# Patient Record
Sex: MALE | Race: WHITE | NOT HISPANIC OR LATINO | Employment: FULL TIME | URBAN - METROPOLITAN AREA
[De-identification: names, ages, dates, MRNs, and addresses within clinical notes are randomized per-mention and may not be internally consistent; named-entity substitution may affect disease eponyms.]

---

## 2023-09-15 ENCOUNTER — HOSPITAL ENCOUNTER (OUTPATIENT)
Dept: DATA CONVERSION | Facility: HOSPITAL | Age: 68
Discharge: HOME | End: 2023-09-15

## 2023-09-15 DIAGNOSIS — Z00.00 ENCOUNTER FOR GENERAL ADULT MEDICAL EXAMINATION WITHOUT ABNORMAL FINDINGS: ICD-10-CM

## 2023-09-15 LAB
APPEARANCE PLAS: ABNORMAL
CHOLEST SERPL-MCNC: 131 MG/DL (ref 133–200)
CHOLEST/HDLC SERPL: 3.3 RATIO
COLOR SPUN FLD: ABNORMAL
FASTING STATUS PATIENT QL REPORTED: ABNORMAL
HDLC SERPL-MCNC: 40 MG/DL
LDLC SERPL CALC-MCNC: 71 MG/DL (ref 65–130)
TRIGL SERPL-MCNC: 99 MG/DL (ref 40–150)

## 2023-10-19 DIAGNOSIS — M25.50 GENERALIZED JOINT PAIN: Primary | ICD-10-CM

## 2023-10-20 RX ORDER — MELOXICAM 15 MG/1
15 TABLET ORAL DAILY
Qty: 90 TABLET | Refills: 3 | Status: SHIPPED | OUTPATIENT
Start: 2023-10-20

## 2023-11-17 DIAGNOSIS — E78.5 DYSLIPIDEMIA: Primary | ICD-10-CM

## 2023-11-18 RX ORDER — ATORVASTATIN CALCIUM 80 MG/1
80 TABLET, FILM COATED ORAL DAILY
Qty: 90 TABLET | Refills: 3 | Status: SHIPPED | OUTPATIENT
Start: 2023-11-18

## 2024-03-11 ENCOUNTER — TELEPHONE (OUTPATIENT)
Dept: PRIMARY CARE | Facility: CLINIC | Age: 69
End: 2024-03-11
Payer: COMMERCIAL

## 2024-03-11 NOTE — TELEPHONE ENCOUNTER
Patient complain of having loose stool  since feb 2024 patient states it is like mush it is not liquid but it is not formed either, he stated he has tried OTC MEDS, he had cut down his meal intake, increase his fiber and nothing seem to help he is running to bathroom every 20 mins he states he has no control last seen in office 9/15/23 please advise

## 2024-03-14 ENCOUNTER — TELEPHONE (OUTPATIENT)
Dept: PRIMARY CARE | Facility: CLINIC | Age: 69
End: 2024-03-14
Payer: COMMERCIAL

## 2024-03-14 DIAGNOSIS — Z12.12 SCREENING FOR COLORECTAL CANCER: Primary | ICD-10-CM

## 2024-03-14 DIAGNOSIS — Z12.11 SCREENING FOR COLORECTAL CANCER: Primary | ICD-10-CM

## 2024-03-14 NOTE — TELEPHONE ENCOUNTER
Pt is needing a referral to Dr. Dubose for a colonoscopy, please pace the referral- I pulled it in, pended

## 2024-03-21 DIAGNOSIS — G25.81 RLS (RESTLESS LEGS SYNDROME): Primary | ICD-10-CM

## 2024-03-21 RX ORDER — ROPINIROLE 2 MG/1
2 TABLET, FILM COATED ORAL NIGHTLY
Qty: 90 TABLET | Refills: 3 | Status: SHIPPED | OUTPATIENT
Start: 2024-03-21

## 2024-03-21 RX ORDER — ROPINIROLE 2 MG/1
2 TABLET, FILM COATED ORAL NIGHTLY
COMMUNITY
End: 2024-03-21 | Stop reason: WASHOUT

## 2024-03-21 NOTE — TELEPHONE ENCOUNTER
Last office visit:    09/15/23  NO pending appt    I had to pull the medication into the med list?

## 2024-03-29 ENCOUNTER — OFFICE VISIT (OUTPATIENT)
Dept: SURGERY | Facility: CLINIC | Age: 69
End: 2024-03-29
Payer: COMMERCIAL

## 2024-03-29 VITALS
TEMPERATURE: 98 F | WEIGHT: 160 LBS | HEART RATE: 75 BPM | SYSTOLIC BLOOD PRESSURE: 140 MMHG | HEIGHT: 71 IN | BODY MASS INDEX: 22.4 KG/M2 | DIASTOLIC BLOOD PRESSURE: 60 MMHG

## 2024-03-29 DIAGNOSIS — Z85.048 HISTORY OF RECTAL CANCER: ICD-10-CM

## 2024-03-29 DIAGNOSIS — Z12.12 SCREENING FOR COLORECTAL CANCER: ICD-10-CM

## 2024-03-29 DIAGNOSIS — R19.4 CHANGE IN BOWEL HABIT: Primary | ICD-10-CM

## 2024-03-29 DIAGNOSIS — R19.5 HEME POSITIVE STOOL: ICD-10-CM

## 2024-03-29 DIAGNOSIS — Z12.11 SCREENING FOR COLORECTAL CANCER: ICD-10-CM

## 2024-03-29 PROCEDURE — 1159F MED LIST DOCD IN RCRD: CPT | Performed by: SURGERY

## 2024-03-29 PROCEDURE — 1160F RVW MEDS BY RX/DR IN RCRD: CPT | Performed by: SURGERY

## 2024-03-29 PROCEDURE — 99213 OFFICE O/P EST LOW 20 MIN: CPT | Performed by: SURGERY

## 2024-03-29 RX ORDER — OXAPROZIN 600 MG/1
1 TABLET, FILM COATED ORAL 2 TIMES DAILY
COMMUNITY
End: 2024-04-25 | Stop reason: ALTCHOICE

## 2024-03-29 RX ORDER — SILDENAFIL 50 MG/1
25 TABLET, FILM COATED ORAL
COMMUNITY
Start: 2013-09-17

## 2024-03-29 NOTE — PATIENT INSTRUCTIONS
We Will schedule you for a flexible sigmoidoscopy on May 14, 2024 due to recent change in bowel habit.  My office will provide you the preoperative instructions.  You will need to take a fleets enema prior to your procedure.  We will give you 1 bottle of magnesium citrate on the morning of the procedure

## 2024-03-29 NOTE — PROGRESS NOTES
Patient ID: Ever Gibbs is a 68 y.o. male.  Who presents for change in bowel habit.  Patient has a history of rectal carcinoma treated in 2008.  He had a colonoscopy in November 2022.    Subjective   HPI     Patient has a history of rectal carcinoma treated in 2008.  He had a colonoscopy in November 2022.    Review of Systems   All other systems reviewed and are negative.      Objective   Physical Exam  Vitals reviewed.   Constitutional:       Appearance: Normal appearance.   HENT:      Head: Normocephalic.   Cardiovascular:      Rate and Rhythm: Normal rate and regular rhythm.      Heart sounds: Normal heart sounds.   Pulmonary:      Effort: Pulmonary effort is normal.      Breath sounds: Normal breath sounds.   Abdominal:      General: Abdomen is flat. There is no distension.      Palpations: Abdomen is soft. There is no mass.      Tenderness: There is no abdominal tenderness. There is no guarding.      Hernia: No hernia is present.      Comments: Palpable fullness rectum.  Heme positive   Musculoskeletal:         General: Normal range of motion.      Cervical back: Normal range of motion.   Skin:     General: Skin is warm.   Neurological:      General: No focal deficit present.   Psychiatric:         Mood and Affect: Mood normal.         Problem List Items Addressed This Visit       Change in bowel habit - Primary    Heme positive stool    History of rectal cancer     Other Visit Diagnoses       Screening for colorectal cancer                 Assessment/Plan   -Will schedule flexible sigmoidoscopy to assess your left colon and anastomosis.  Will perform this on May 14, 2024

## 2024-04-25 ENCOUNTER — ANESTHESIA EVENT (OUTPATIENT)
Dept: ANESTHESIOLOGY | Facility: HOSPITAL | Age: 69
End: 2024-04-25

## 2024-04-25 ENCOUNTER — PRE-ADMISSION TESTING (OUTPATIENT)
Dept: PREADMISSION TESTING | Facility: HOSPITAL | Age: 69
End: 2024-04-25
Payer: COMMERCIAL

## 2024-04-25 PROBLEM — R73.03 PREDIABETES: Status: ACTIVE | Noted: 2024-04-25

## 2024-04-25 PROBLEM — R91.8 PULMONARY NODULES: Status: ACTIVE | Noted: 2024-04-25

## 2024-04-25 PROBLEM — I10 HTN (HYPERTENSION): Status: ACTIVE | Noted: 2024-04-25

## 2024-04-25 PROBLEM — E78.5 HYPERLIPIDEMIA: Status: ACTIVE | Noted: 2024-04-25

## 2024-04-25 RX ORDER — ALBUTEROL SULFATE 0.63 MG/3ML
0.63 SOLUTION RESPIRATORY (INHALATION) EVERY 6 HOURS PRN
COMMUNITY

## 2024-04-25 RX ORDER — CLOPIDOGREL BISULFATE 75 MG/1
75 TABLET ORAL DAILY
COMMUNITY

## 2024-04-25 RX ORDER — LISINOPRIL 10 MG/1
10 TABLET ORAL DAILY
COMMUNITY

## 2024-04-25 ASSESSMENT — DUKE ACTIVITY SCORE INDEX (DASI)
CAN YOU DO YARD WORK LIKE RAKING LEAVES, WEEDING OR PUSHING A MOWER: YES
CAN YOU DO HEAVY WORK AROUND THE HOUSE LIKE SCRUBBING FLOORS OR LIFTING AND MOVING HEAVY FURNITURE: NO
CAN YOU WALK A BLOCK OR TWO ON LEVEL GROUND: YES
CAN YOU RUN A SHORT DISTANCE: NO
CAN YOU WALK INDOORS, SUCH AS AROUND YOUR HOUSE: YES
TOTAL_SCORE: 28.7
CAN YOU DO MODERATE WORK AROUND THE HOUSE LIKE VACUUMING, SWEEPING FLOORS OR CARRYING GROCERIES: YES
CAN YOU HAVE SEXUAL RELATIONS: YES
CAN YOU PARTICIPATE IN MODERATE RECREATIONAL ACTIVITIES LIKE GOLF, BOWLING, DANCING, DOUBLES TENNIS OR THROWING A BASEBALL OR FOOTBALL: NO
CAN YOU TAKE CARE OF YOURSELF (EAT, DRESS, BATHE, OR USE TOILET): YES
CAN YOU DO LIGHT WORK AROUND THE HOUSE LIKE DUSTING OR WASHING DISHES: YES
CAN YOU PARTICIPATE IN STRENOUS SPORTS LIKE SWIMMING, SINGLES TENNIS, FOOTBALL, BASKETBALL, OR SKIING: NO
CAN YOU CLIMB A FLIGHT OF STAIRS OR WALK UP A HILL: YES
DASI METS SCORE: 6.3

## 2024-04-25 NOTE — ANESTHESIA PREPROCEDURE EVALUATION
Patient: Ever Gibbs    Procedure Information    Date: 04/25/24  Reason: anesthesia pre op         Relevant Problems   Cardiac   (+) HTN (hypertension)   (+) Hyperlipidemia      Pulmonary   (+) Pulmonary nodules     Prediabetic on Metformin       Chemistry    Lab Results   Component Value Date/Time     03/03/2023 0833    K 4.5 03/03/2023 0833     03/03/2023 0833    CO2 21 (L) 03/03/2023 0833    BUN 19 03/03/2023 0833    CREATININE 0.9 03/03/2023 0833    Lab Results   Component Value Date/Time    CALCIUM 9.6 03/03/2023 0833    ALKPHOS 55 03/03/2023 0833    AST 16 03/03/2023 0833    ALT 15 03/03/2023 0833    BILITOT 0.4 03/03/2023 0833         Clinical information reviewed:                   NPO Detail:  No data recorded     PHYSICAL EXAM    Anesthesia Plan    History of general anesthesia?: yes  History of complications of general anesthesia?: no    ASA 3     MAC     intravenous induction

## 2024-04-25 NOTE — PREPROCEDURE INSTRUCTIONS
Medication List            Accurate as of April 25, 2024  9:39 AM. Always use your most recent med list.                albuterol 0.63 mg/3 mL nebulizer solution  Medication Adjustments for Surgery: Take morning of surgery with sip of water, no other fluids     atorvastatin 80 mg tablet  Commonly known as: Lipitor  TAKE 1 TABLET BY MOUTH EVERY DAY  Medication Adjustments for Surgery: Continue until night before surgery     clopidogrel 75 mg tablet  Commonly known as: Plavix  Medication Adjustments for Surgery: Other (Comment)  Notes to patient: HOLD 5 DAYS PRIOR TO PROCEDURE     lisinopril 10 mg tablet  Medication Adjustments for Surgery: Stop 1 day before surgery     meloxicam 15 mg tablet  Commonly known as: Mobic  TAKE 1 TABLET BY MOUTH EVERY DAY  Medication Adjustments for Surgery: Stop 1 day before surgery     metFORMIN 250 MG split tablet  Commonly known as: Glucophage  Medication Adjustments for Surgery: Continue until night before surgery     rOPINIRole 2 mg tablet  Commonly known as: Requip  TAKE 1 TABLET BY MOUTH EVERY DAY AT BEDTIME  Medication Adjustments for Surgery: Take morning of surgery with sip of water, no other fluids     sildenafil 50 mg tablet  Commonly known as: Viagra                              NPO Instructions:    No Solid food after midnight the night before your procedure  You may have clear liquids up to three hours prior to procedure  Drink the bottle of mag. Citrate at 8pm the night before your procedure  Do the enema 2 hours prior to procedure    Additional Instructions:     Review your medication instructions, take indicated medications  If you have diabetes, please check your fasting blood sugar upon awakening.  If fasting blood sugar is <80 mg/dl, drink 100 ml of apple juice, time limit of 2 hours before  You may have clear liquids until TWO hours before surgery/procedure.  This includes water, black tea/coffee, (no milk or cream) apple juice and electrolyte drinks  ([place])  You may chew gum up to TWO hours before your surgery/procedure  Wear  comfortable loose fitting clothing  Do not use moisturizers, creams, lotions or perfume  All jewelry and valuables should be left at home    We will call you the business day before your procedure between 1-3p to confirm your procedure and arrival time  Please park in the back of the hospital, in the ED parking and proceed to the second floor  Please make arrangements to have a responsible adult take you home and stay with you for 24 hours  Please bring your ID and insurance card to your procedure  Please shower or bathe the morning of your procedure with antibacterial soap. You may receive instructions for Hibiclens shower.   Shower as you normally would do   Use soap from neck down, focusing on area that is having the surgery (avoid eyes and genitals)   Turn water off and let dry for three minutes.   Turn water back on and rinse off  When checked in to the outpatient unit, you will be asked to change into a hospital gown and remove all clothing, including underwear  An IV will be inserted   Your family or  will be asked to wait in the waiting room or cafeteria and will be notified when able to come sit with you  Please call [phone] with any further questions

## 2024-05-14 ENCOUNTER — HOSPITAL ENCOUNTER (OUTPATIENT)
Dept: GASTROENTEROLOGY | Facility: HOSPITAL | Age: 69
Setting detail: OUTPATIENT SURGERY
Discharge: HOME | End: 2024-05-14
Payer: COMMERCIAL

## 2024-05-14 ENCOUNTER — ANESTHESIA (OUTPATIENT)
Dept: GASTROENTEROLOGY | Facility: HOSPITAL | Age: 69
End: 2024-05-14
Payer: COMMERCIAL

## 2024-05-14 ENCOUNTER — ANESTHESIA EVENT (OUTPATIENT)
Dept: GASTROENTEROLOGY | Facility: HOSPITAL | Age: 69
End: 2024-05-14
Payer: COMMERCIAL

## 2024-05-14 VITALS
WEIGHT: 160 LBS | TEMPERATURE: 98.2 F | RESPIRATION RATE: 18 BRPM | HEIGHT: 71 IN | OXYGEN SATURATION: 92 % | SYSTOLIC BLOOD PRESSURE: 119 MMHG | HEART RATE: 71 BPM | DIASTOLIC BLOOD PRESSURE: 71 MMHG | BODY MASS INDEX: 22.4 KG/M2

## 2024-05-14 DIAGNOSIS — R19.4 CHANGE IN BOWEL HABIT: ICD-10-CM

## 2024-05-14 DIAGNOSIS — R19.5 HEME POSITIVE STOOL: ICD-10-CM

## 2024-05-14 PROBLEM — Z79.01 ANTICOAGULANT LONG-TERM USE: Status: ACTIVE | Noted: 2024-05-14

## 2024-05-14 PROBLEM — I73.9 PERIPHERAL VASCULAR DISEASE (CMS-HCC): Status: ACTIVE | Noted: 2024-05-14

## 2024-05-14 LAB — GLUCOSE BLD MANUAL STRIP-MCNC: 98 MG/DL (ref 74–99)

## 2024-05-14 PROCEDURE — 2500000004 HC RX 250 GENERAL PHARMACY W/ HCPCS (ALT 636 FOR OP/ED): Performed by: NURSE ANESTHETIST, CERTIFIED REGISTERED

## 2024-05-14 PROCEDURE — 3700000002 HC GENERAL ANESTHESIA TIME - EACH INCREMENTAL 1 MINUTE

## 2024-05-14 PROCEDURE — 3700000001 HC GENERAL ANESTHESIA TIME - INITIAL BASE CHARGE

## 2024-05-14 PROCEDURE — 7100000010 HC PHASE TWO TIME - EACH INCREMENTAL 1 MINUTE

## 2024-05-14 PROCEDURE — 2500000004 HC RX 250 GENERAL PHARMACY W/ HCPCS (ALT 636 FOR OP/ED)

## 2024-05-14 PROCEDURE — 7100000009 HC PHASE TWO TIME - INITIAL BASE CHARGE

## 2024-05-14 PROCEDURE — 45330 DIAGNOSTIC SIGMOIDOSCOPY: CPT | Performed by: SURGERY

## 2024-05-14 PROCEDURE — 82947 ASSAY GLUCOSE BLOOD QUANT: CPT

## 2024-05-14 RX ORDER — SODIUM CHLORIDE, SODIUM LACTATE, POTASSIUM CHLORIDE, CALCIUM CHLORIDE 600; 310; 30; 20 MG/100ML; MG/100ML; MG/100ML; MG/100ML
20 INJECTION, SOLUTION INTRAVENOUS CONTINUOUS
Status: DISCONTINUED | OUTPATIENT
Start: 2024-05-14 | End: 2024-05-15 | Stop reason: HOSPADM

## 2024-05-14 RX ORDER — PROPOFOL 10 MG/ML
INJECTION, EMULSION INTRAVENOUS AS NEEDED
Status: DISCONTINUED | OUTPATIENT
Start: 2024-05-14 | End: 2024-05-14

## 2024-05-14 RX ADMIN — SODIUM CHLORIDE, POTASSIUM CHLORIDE, SODIUM LACTATE AND CALCIUM CHLORIDE 20 ML/HR: 600; 310; 30; 20 INJECTION, SOLUTION INTRAVENOUS at 06:57

## 2024-05-14 RX ADMIN — PROPOFOL 50 MG: 10 INJECTION, EMULSION INTRAVENOUS at 07:30

## 2024-05-14 RX ADMIN — PROPOFOL 50 MG: 10 INJECTION, EMULSION INTRAVENOUS at 07:34

## 2024-05-14 RX ADMIN — PROPOFOL 100 MG: 10 INJECTION, EMULSION INTRAVENOUS at 07:24

## 2024-05-14 SDOH — HEALTH STABILITY: MENTAL HEALTH: CURRENT SMOKER: 1

## 2024-05-14 ASSESSMENT — PAIN - FUNCTIONAL ASSESSMENT
PAIN_FUNCTIONAL_ASSESSMENT: 0-10

## 2024-05-14 ASSESSMENT — PAIN SCALES - GENERAL
PAINLEVEL_OUTOF10: 0 - NO PAIN

## 2024-05-14 ASSESSMENT — COLUMBIA-SUICIDE SEVERITY RATING SCALE - C-SSRS
2. HAVE YOU ACTUALLY HAD ANY THOUGHTS OF KILLING YOURSELF?: NO
1. IN THE PAST MONTH, HAVE YOU WISHED YOU WERE DEAD OR WISHED YOU COULD GO TO SLEEP AND NOT WAKE UP?: NO
6. HAVE YOU EVER DONE ANYTHING, STARTED TO DO ANYTHING, OR PREPARED TO DO ANYTHING TO END YOUR LIFE?: NO

## 2024-05-14 NOTE — H&P
History Of Present Illness  Ever Gibbs is a 68 y.o. male presenting for a flexible sigmoidoscopy.  Has been having some heme positive stools.     Past Medical History  Past Medical History:   Diagnosis Date    Hypertension     Other conditions influencing health status     Colon Cancer    Other conditions influencing health status     Arthritis    Perforated chronic peptic ulcer (Multi)        Surgical History  Past Surgical History:   Procedure Laterality Date    CT AORTA AND BILATERAL ILIOFEMORAL RUNOFF ANGIOGRAM W AND/OR WO IV CONTRAST  03/01/2023    CT AORTA AND BILATERAL ILIOFEMORAL RUNOFF ANGIOGRAM W AND/OR WO IV CONTRAST [facility] INPATIENT LEGACY    FEMORAL ARTERY STENT      ILEOSTOMY  10/15/2012    Ileostomy    OTHER SURGICAL HISTORY  10/15/2012    Ileostomy Reversal        Social History  He reports that he has been smoking cigarettes. He has never used smokeless tobacco. He reports current alcohol use. He reports that he does not use drugs.    Family History  No family history on file.     Allergies  Sulfa (sulfonamide antibiotics) and Adhesive tape-silicones    Review of Systems   All other systems reviewed and are negative.       Physical Exam  Vitals reviewed.   Constitutional:       Appearance: Normal appearance.   HENT:      Head: Normocephalic.   Cardiovascular:      Rate and Rhythm: Normal rate and regular rhythm.      Heart sounds: Normal heart sounds.   Pulmonary:      Effort: Pulmonary effort is normal.      Breath sounds: Normal breath sounds.   Abdominal:      General: Abdomen is flat. There is no distension.      Palpations: Abdomen is soft. There is no mass.      Tenderness: There is no abdominal tenderness. There is no guarding.   Musculoskeletal:         General: Normal range of motion.      Cervical back: Normal range of motion.   Skin:     General: Skin is warm.   Neurological:      General: No focal deficit present.   Psychiatric:         Mood and Affect: Mood normal.          Last  "Recorded Vitals  Blood pressure 125/57, pulse 100, temperature 36.3 °C (97.3 °F), temperature source Temporal, resp. rate 17, height 1.803 m (5' 11\"), weight 72.6 kg (160 lb), SpO2 100%.    FLEXIBLE SIGMOIDOSCOPY /BIOPSIES.  Risks include, but not limited to pain, infection, bleeding, perforation, missed lesions, aspiration, risk of cardiac, pulmonary, neurologic, locomotor, anesthetic events, incomplete colonoscopy, and other unforeseen complications including death  Lg Dubose MD    "

## 2024-05-14 NOTE — ANESTHESIA PREPROCEDURE EVALUATION
Patient: Ever Gibbs    Procedure Information       Date/Time: 05/14/24 0730    Scheduled providers: Lg Dubose MD    Procedure: FLEXIBLE SIGMOIDOSCOPY    Location: [facility]            Relevant Problems   Anesthesia (within normal limits)      Cardiac   (+) HTN (hypertension)   (+) Hyperlipidemia   (+) Peripheral vascular disease (CMS-HCC)      Pulmonary   (+) Pulmonary nodules      Neuro (within normal limits)      GI (within normal limits)      /Renal (within normal limits)      Liver (within normal limits)      Endocrine (within normal limits)      Hematology   (+) Anticoagulant long-term use      Musculoskeletal (within normal limits)      HEENT (within normal limits)      ID (within normal limits)      Skin (within normal limits)      GYN (within normal limits)       Clinical information reviewed:   Tobacco  Allergies  Meds   Med Hx  Surg Hx   Fam Hx  Soc Hx        NPO Detail:  NPO/Void Status  Carbohydrate Drink Given Prior to Surgery? : N  Date of Last Liquid: 05/13/24  Time of Last Liquid: 2200  Date of Last Solid: 05/12/24  Time of Last Solid: 1900  Last Intake Type: Clear fluids         Physical Exam    Airway  Mallampati: II  TM distance: >3 FB  Neck ROM: full     Cardiovascular - normal exam     Dental   (+) upper dentures, lower dentures     Pulmonary - normal exam     Abdominal - normal exam         Anesthesia Plan    History of general anesthesia?: yes  History of complications of general anesthesia?: no    ASA 3     MAC     The patient is a current smoker.  Patient was not previously instructed to abstain from smoking on day of procedure.  Patient did not smoke on day of procedure.    intravenous induction   Anesthetic plan and risks discussed with patient.  Use of blood products discussed with patient who consented to blood products.    Plan discussed with CRNA.

## 2024-05-14 NOTE — DISCHARGE INSTRUCTIONS

## 2024-05-14 NOTE — ANESTHESIA POSTPROCEDURE EVALUATION
Patient: Ever Gibbs    Procedure Summary       Date: 05/14/24 Room / Location: [facility]    Anesthesia Start: 0720 Anesthesia Stop: 0739    Procedure: FLEXIBLE SIGMOIDOSCOPY Diagnosis:       Change in bowel habit      Heme positive stool    Scheduled Providers: Lg Dubose MD Responsible Provider: EDILSON Sosa    Anesthesia Type: MAC ASA Status: 3            Anesthesia Type: MAC    Vitals Value Taken Time   BP 98/61 05/14/24 0738   Temp 36.8 °C (98.2 °F) 05/14/24 0738   Pulse 65 05/14/24 0738   Resp 16 05/14/24 0738   SpO2 94 % 05/14/24 0738       Anesthesia Post Evaluation    Patient location during evaluation: PACU  Patient participation: complete - patient participated  Level of consciousness: awake and alert  Pain management: satisfactory to patient  Airway patency: patent  Cardiovascular status: acceptable  Respiratory status: acceptable  Hydration status: acceptable  Postoperative Nausea and Vomiting: none        There were no known notable events for this encounter.

## 2024-05-16 ASSESSMENT — PAIN SCALES - GENERAL: PAINLEVEL_OUTOF10: 0 - NO PAIN

## 2024-05-20 DIAGNOSIS — E78.5 DYSLIPIDEMIA: ICD-10-CM

## 2024-05-20 RX ORDER — ATORVASTATIN CALCIUM 80 MG/1
80 TABLET, FILM COATED ORAL DAILY
Qty: 90 TABLET | Refills: 0 | Status: CANCELLED | OUTPATIENT
Start: 2024-05-20

## 2024-09-05 DIAGNOSIS — R73.03 PREDIABETES: Primary | ICD-10-CM

## 2024-09-06 PROBLEM — E55.9 VITAMIN D DEFICIENCY: Status: ACTIVE | Noted: 2024-09-06

## 2024-09-06 PROBLEM — G25.81 RESTLESS LEGS SYNDROME: Status: ACTIVE | Noted: 2024-09-06

## 2024-09-06 PROBLEM — R19.5 HEME POSITIVE STOOL: Status: RESOLVED | Noted: 2024-03-29 | Resolved: 2024-09-06

## 2024-09-06 PROBLEM — E78.5 DYSLIPIDEMIA: Status: ACTIVE | Noted: 2024-09-06

## 2024-09-06 PROBLEM — E11.9 TYPE 2 DIABETES MELLITUS WITHOUT COMPLICATION (MULTI): Status: ACTIVE | Noted: 2024-09-06

## 2024-09-06 PROBLEM — N52.9 IMPOTENCE OF ORGANIC ORIGIN: Status: ACTIVE | Noted: 2024-09-06

## 2024-09-06 PROBLEM — Z20.822 CONTACT WITH AND (SUSPECTED) EXPOSURE TO COVID-19: Status: RESOLVED | Noted: 2023-03-01 | Resolved: 2024-09-06

## 2024-09-06 PROBLEM — E78.5 DYSLIPIDEMIA: Status: ACTIVE | Noted: 2024-04-25

## 2024-09-06 PROBLEM — R19.4 CHANGE IN BOWEL HABIT: Status: RESOLVED | Noted: 2024-03-29 | Resolved: 2024-09-06

## 2024-09-06 PROBLEM — M19.90 GENERALIZED ARTHRITIS: Status: ACTIVE | Noted: 2023-03-01

## 2024-09-06 PROBLEM — Z20.822 CONTACT WITH AND (SUSPECTED) EXPOSURE TO COVID-19: Status: ACTIVE | Noted: 2023-03-01

## 2024-09-06 PROBLEM — H90.3 SENSORINEURAL HEARING LOSS, BILATERAL: Status: ACTIVE | Noted: 2024-09-06

## 2024-09-06 PROBLEM — G57.12 MERALGIA PARESTHETICA, LEFT LOWER LIMB: Status: ACTIVE | Noted: 2024-09-06

## 2024-09-06 PROBLEM — K26.1: Status: ACTIVE | Noted: 2019-04-12

## 2024-09-06 PROBLEM — F17.200 SMOKER: Status: ACTIVE | Noted: 2023-03-14

## 2024-09-06 PROBLEM — J44.9 CHRONIC OBSTRUCTIVE PULMONARY DISEASE (MULTI): Status: ACTIVE | Noted: 2024-09-06

## 2024-09-06 PROBLEM — L97.529 ULCER OF LEFT FOOT (MULTI): Status: ACTIVE | Noted: 2023-03-01

## 2024-09-06 PROBLEM — R73.03 PREDIABETES: Status: ACTIVE | Noted: 2024-09-06

## 2024-09-06 PROBLEM — M19.90 OSTEOARTHRITIS: Status: ACTIVE | Noted: 2024-09-06

## 2024-09-06 NOTE — PROGRESS NOTES
HPI:  pt here for routine wellness exam w/o any issues .      [facility], HLD, copd etc.    Recent labs in July: cbc, cmp, u/a, a/c ratio, hga1c all good.  Hga1c 6.2    PMH:   Past Medical History:   Diagnosis Date    Anticoagulant long-term use 05/14/2024    Chronic obstructive pulmonary disease (Multi) 09/06/2024    Contact with and (suspected) exposure to covid-19 03/01/2023    Dyslipidemia 04/25/2024    Generalized arthritis 03/01/2023    History of rectal cancer 03/29/2024 2008    Hypertension     Impotence of organic origin 09/06/2024    Meralgia paresthetica, left lower limb 09/06/2024    Perforated chronic peptic ulcer (Multi) 2019    Peripheral vascular disease (CMS-HCC) 05/14/2024    Pulmonary nodules 04/25/2024 2013-resolved on own    Restless legs syndrome 09/06/2024    Smoker 03/14/2023    Ulcer of left foot (Multi) 03/01/2023    Vitamin D deficiency 09/06/2024     MEDICATIONS:   Current Outpatient Medications   Medication Sig Dispense Refill    albuterol 0.63 mg/3 mL nebulizer solution Take 3 mL (0.63 mg) by nebulization every 6 hours if needed for wheezing.      atorvastatin (Lipitor) 80 mg tablet TAKE 1 TABLET BY MOUTH EVERY DAY 90 tablet 3    clopidogrel (Plavix) 75 mg tablet Take 1 tablet (75 mg) by mouth once daily.      lisinopril 10 mg tablet Take 1 tablet (10 mg) by mouth once daily.      meloxicam (Mobic) 15 mg tablet TAKE 1 TABLET BY MOUTH EVERY DAY 90 tablet 3    metFORMIN (Glucophage) 250 MG split tablet Take by mouth.      rOPINIRole (Requip) 2 mg tablet TAKE 1 TABLET BY MOUTH EVERY DAY AT BEDTIME 90 tablet 3    sildenafil (Viagra) 50 mg tablet Take 0.5 tablets (25 mg) by mouth.       No current facility-administered medications for this visit.     ALLERGIES:    Allergies   Allergen Reactions    Sulfa (Sulfonamide Antibiotics) Unknown    Adhesive Tape-Silicones Rash     SOCIAL HX:    Social History     Tobacco Use    Smoking status: Every Day     Current  packs/day: 1.00     Average packs/day: 1 pack/day for 60.7 years (60.7 ttl pk-yrs)     Types: Cigarettes     Start date: 1/1/1964    Smokeless tobacco: Never    Tobacco comments:     57 years.   Vaping Use    Vaping status: Never Used   Substance Use Topics    Alcohol use: Not Currently    Drug use: Not Currently     FAMILY HX:   Family History   Problem Relation Name Age of Onset    Colon cancer Mother      Diabetes Father      Coronary artery disease Father      Stroke Father      Glaucoma Father      Diabetes Brother         ROS:             CONSTITUTIONAL:          Negative for concerns, fever, chills.         HEENT:          no Difficulty swallowing.  no Hearing loss.  no Poor sense of smell.   No change in vision ; no headaches     CARDIOLOGY:          Chest pain none.  Palpitations none.  Shortness of breath none.         RESPIRATORY:          Negative for persistent cough , wheezing, trouble breathing.         GASTROENTEROLOGY:          Negative for abdominal pain, change in bowel habits.         ENDOCRINOLOGY:          Negative for fatigue, polydipsia, polyuria, weight loss, weight gain, cold intolerance, heat intolerance.         MUSCULOSKELETAL:          Negative for myalgias, joint pain.         DERMATOLOGY:          Negative for rash, bruising, moles.         NEUROLOGY:          Negative for weakness, headache, dizziness.     VITALS: /76   Pulse 67   Wt 72.9 kg (160 lb 12.8 oz)   SpO2 97%   BMI 22.43 kg/m²      PE:  General Appearance: awake, alert, oriented, in no acute distress  Head/Face: NCAT  Eyes: No gross abnormalities., PERRL, and EOMI  Ears: canals and TMs NI  Mouth/Throat: Mucosa moist, no lesions; pharynx without erythema, edema or exudate.  Neck: neck- supple, no mass, non-tender  Lungs: Normal expansion.  Clear to auscultation.  No rales, rhonchi, or wheezing.  Heart: Heart sounds are normal.  Regular rate and rhythm without murmur, gallop or rub.  Abdomen: Soft, non-tender,  normal bowel sounds; no bruits, organomegaly or masses.  Extremities: Extremities warm to touch, pink, with no edema.  Musculoskeletal: Spine range of motion normal. Muscular strength intact., Range of motion normal in hips, knees, shoulders, and spine.  Neurologic: Alert and oriented x 3, gait normal., reflexes normal and symmetric, strength and  sensation grossly normal    Ever was seen today for annual exam.  Diagnoses and all orders for this visit:  Annual physical exam (Primary)  Primary hypertension  Comments:  cont lisinopril  Dyslipidemia  Comments:  cont atorvastatin  Prediabetes  Comments:  cont metformin  Peripheral vascular disease (CMS-HCC)  Comments:  cont plavix  Chronic obstructive pulmonary disease, unspecified COPD type (Multi)  Comments:  cont albuterol  Restless legs syndrome  Comments:  cont ropinirole  Smoker  Comments:  not interested in low dose chest ct  History of rectal cancer

## 2024-09-09 RX ORDER — METFORMIN HYDROCHLORIDE 500 MG/1
500 TABLET, EXTENDED RELEASE ORAL DAILY
Qty: 90 TABLET | OUTPATIENT
Start: 2024-09-09

## 2024-09-20 ENCOUNTER — OFFICE VISIT (OUTPATIENT)
Dept: PRIMARY CARE | Facility: CLINIC | Age: 69
End: 2024-09-20
Payer: COMMERCIAL

## 2024-09-20 VITALS
WEIGHT: 160.8 LBS | DIASTOLIC BLOOD PRESSURE: 76 MMHG | HEART RATE: 67 BPM | BODY MASS INDEX: 22.43 KG/M2 | SYSTOLIC BLOOD PRESSURE: 138 MMHG | OXYGEN SATURATION: 97 %

## 2024-09-20 DIAGNOSIS — G25.81 RESTLESS LEGS SYNDROME: ICD-10-CM

## 2024-09-20 DIAGNOSIS — Z85.048 HISTORY OF RECTAL CANCER: ICD-10-CM

## 2024-09-20 DIAGNOSIS — J44.9 CHRONIC OBSTRUCTIVE PULMONARY DISEASE, UNSPECIFIED COPD TYPE (MULTI): ICD-10-CM

## 2024-09-20 DIAGNOSIS — I73.9 PERIPHERAL VASCULAR DISEASE (CMS-HCC): ICD-10-CM

## 2024-09-20 DIAGNOSIS — E78.5 DYSLIPIDEMIA: ICD-10-CM

## 2024-09-20 DIAGNOSIS — Z00.00 ANNUAL PHYSICAL EXAM: Primary | ICD-10-CM

## 2024-09-20 DIAGNOSIS — F17.200 SMOKER: ICD-10-CM

## 2024-09-20 DIAGNOSIS — R73.03 PREDIABETES: ICD-10-CM

## 2024-09-20 DIAGNOSIS — I10 PRIMARY HYPERTENSION: ICD-10-CM

## 2024-09-20 PROBLEM — L97.529 ULCER OF LEFT FOOT (MULTI): Status: RESOLVED | Noted: 2023-03-01 | Resolved: 2024-09-20

## 2024-09-20 PROCEDURE — 1160F RVW MEDS BY RX/DR IN RCRD: CPT | Performed by: FAMILY MEDICINE

## 2024-09-20 PROCEDURE — 4004F PT TOBACCO SCREEN RCVD TLK: CPT | Performed by: FAMILY MEDICINE

## 2024-09-20 PROCEDURE — 99397 PER PM REEVAL EST PAT 65+ YR: CPT | Performed by: FAMILY MEDICINE

## 2024-09-20 PROCEDURE — 1126F AMNT PAIN NOTED NONE PRSNT: CPT | Performed by: FAMILY MEDICINE

## 2024-09-20 PROCEDURE — 3075F SYST BP GE 130 - 139MM HG: CPT | Performed by: FAMILY MEDICINE

## 2024-09-20 PROCEDURE — 1159F MED LIST DOCD IN RCRD: CPT | Performed by: FAMILY MEDICINE

## 2024-09-20 PROCEDURE — 3078F DIAST BP <80 MM HG: CPT | Performed by: FAMILY MEDICINE

## 2024-09-20 ASSESSMENT — PATIENT HEALTH QUESTIONNAIRE - PHQ9
1. LITTLE INTEREST OR PLEASURE IN DOING THINGS: NOT AT ALL
SUM OF ALL RESPONSES TO PHQ9 QUESTIONS 1 AND 2: 0
2. FEELING DOWN, DEPRESSED OR HOPELESS: NOT AT ALL

## 2024-09-20 ASSESSMENT — PAIN SCALES - GENERAL: PAINLEVEL: 0-NO PAIN

## 2024-10-04 DIAGNOSIS — R73.03 PREDIABETES: Primary | ICD-10-CM

## 2024-10-04 RX ORDER — METFORMIN HYDROCHLORIDE 500 MG/1
500 TABLET, EXTENDED RELEASE ORAL DAILY
Qty: 90 TABLET | Refills: 3 | Status: SHIPPED | OUTPATIENT
Start: 2024-10-04

## 2024-10-18 DIAGNOSIS — M25.50 GENERALIZED JOINT PAIN: ICD-10-CM

## 2024-10-18 RX ORDER — MELOXICAM 15 MG/1
15 TABLET ORAL DAILY
Qty: 90 TABLET | Refills: 3 | Status: SHIPPED | OUTPATIENT
Start: 2024-10-18

## 2024-12-18 DIAGNOSIS — E78.5 DYSLIPIDEMIA: ICD-10-CM

## 2024-12-18 RX ORDER — ATORVASTATIN CALCIUM 80 MG/1
80 TABLET, FILM COATED ORAL DAILY
Qty: 90 TABLET | Refills: 1 | Status: SHIPPED | OUTPATIENT
Start: 2024-12-18

## 2025-03-07 DIAGNOSIS — G25.81 RLS (RESTLESS LEGS SYNDROME): ICD-10-CM

## 2025-03-07 RX ORDER — ROPINIROLE 2 MG/1
2 TABLET, FILM COATED ORAL NIGHTLY
Qty: 90 TABLET | Refills: 3 | Status: SHIPPED | OUTPATIENT
Start: 2025-03-07

## 2025-06-12 ENCOUNTER — OFFICE VISIT (OUTPATIENT)
Dept: PRIMARY CARE | Facility: CLINIC | Age: 70
End: 2025-06-12
Payer: COMMERCIAL

## 2025-06-12 VITALS
WEIGHT: 166.4 LBS | HEART RATE: 75 BPM | BODY MASS INDEX: 23.3 KG/M2 | HEIGHT: 71 IN | SYSTOLIC BLOOD PRESSURE: 136 MMHG | OXYGEN SATURATION: 99 % | DIASTOLIC BLOOD PRESSURE: 78 MMHG

## 2025-06-12 DIAGNOSIS — R73.03 PREDIABETES: ICD-10-CM

## 2025-06-12 DIAGNOSIS — Z13.6 SCREENING FOR CARDIOVASCULAR CONDITION: ICD-10-CM

## 2025-06-12 DIAGNOSIS — Z00.00 ROUTINE GENERAL MEDICAL EXAMINATION AT HEALTH CARE FACILITY: Primary | ICD-10-CM

## 2025-06-12 PROCEDURE — 3078F DIAST BP <80 MM HG: CPT | Performed by: FAMILY MEDICINE

## 2025-06-12 PROCEDURE — G0439 PPPS, SUBSEQ VISIT: HCPCS | Performed by: FAMILY MEDICINE

## 2025-06-12 PROCEDURE — 4004F PT TOBACCO SCREEN RCVD TLK: CPT | Performed by: FAMILY MEDICINE

## 2025-06-12 PROCEDURE — 99397 PER PM REEVAL EST PAT 65+ YR: CPT | Mod: 25 | Performed by: FAMILY MEDICINE

## 2025-06-12 PROCEDURE — 1126F AMNT PAIN NOTED NONE PRSNT: CPT | Performed by: FAMILY MEDICINE

## 2025-06-12 PROCEDURE — 1170F FXNL STATUS ASSESSED: CPT | Performed by: FAMILY MEDICINE

## 2025-06-12 PROCEDURE — 1160F RVW MEDS BY RX/DR IN RCRD: CPT | Performed by: FAMILY MEDICINE

## 2025-06-12 PROCEDURE — 3075F SYST BP GE 130 - 139MM HG: CPT | Performed by: FAMILY MEDICINE

## 2025-06-12 PROCEDURE — 99397 PER PM REEVAL EST PAT 65+ YR: CPT | Performed by: FAMILY MEDICINE

## 2025-06-12 PROCEDURE — 3008F BODY MASS INDEX DOCD: CPT | Performed by: FAMILY MEDICINE

## 2025-06-12 PROCEDURE — 1159F MED LIST DOCD IN RCRD: CPT | Performed by: FAMILY MEDICINE

## 2025-06-12 PROCEDURE — 99215 OFFICE O/P EST HI 40 MIN: CPT | Performed by: FAMILY MEDICINE

## 2025-06-12 ASSESSMENT — ACTIVITIES OF DAILY LIVING (ADL)
GROCERY_SHOPPING: INDEPENDENT
DOING_HOUSEWORK: INDEPENDENT
TAKING_MEDICATION: INDEPENDENT
DRESSING: INDEPENDENT
MANAGING_FINANCES: INDEPENDENT
BATHING: INDEPENDENT

## 2025-06-12 ASSESSMENT — PAIN SCALES - GENERAL: PAINLEVEL_OUTOF10: 0-NO PAIN

## 2025-06-12 NOTE — PROGRESS NOTES
"Subjective   Reason for Visit: Ever Gibbs is an 69 y.o. male here for a Medicare Wellness visit.     Past Medical, Surgical, and Family History reviewed and updated in chart.  Medical History[1]    Reviewed all medications by prescribing practitioner or clinical pharmacist (such as prescriptions, OTCs, herbal therapies and supplements) and documented in the medical record.  Current Medications[2]    HPI here for subsequent medicare wellness  Colorectal: pt defers  Will need refill on atorvastatin and metformin but needs bloodwork.  [facility] [place] but we have no copies of last yrs labs.     Patient Care Team:  Geri Price MD as PCP - General (Family Medicine)  Geri Price MD as Primary Care Provider     Review of Systems  n/a    Objective   Vitals:  /78   Pulse 75   Ht 1.803 m (5' 11\")   Wt 75.5 kg (166 lb 6.4 oz)   SpO2 99%   BMI 23.21 kg/m²       Physical Exam  Vitals reviewed.   Constitutional:       Appearance: Normal appearance.   HENT:      Head: Normocephalic and atraumatic.      Right Ear: Tympanic membrane, ear canal and external ear normal.      Left Ear: Tympanic membrane, ear canal and external ear normal.      Mouth/Throat:      Mouth: Mucous membranes are moist.      Pharynx: Oropharynx is clear.   Eyes:      Extraocular Movements: Extraocular movements intact.      Pupils: Pupils are equal, round, and reactive to light.   Cardiovascular:      Rate and Rhythm: Normal rate and regular rhythm.      Heart sounds: Normal heart sounds.   Pulmonary:      Effort: Pulmonary effort is normal.      Breath sounds: Normal breath sounds.   Abdominal:      Tenderness: There is no right CVA tenderness or left CVA tenderness.   Musculoskeletal:         General: Normal range of motion.   Lymphadenopathy:      Cervical: No cervical adenopathy.   Skin:     General: Skin is warm.   Neurological:      General: No focal deficit present.      Mental Status: He is alert and " oriented to person, place, and time.      Motor: Motor function is intact.      Coordination: Coordination is intact.      Deep Tendon Reflexes: Reflexes are normal and symmetric.   Psychiatric:         Mood and Affect: Mood normal.         Behavior: Behavior normal.         Assessment & Plan  Routine general medical examination at health care facility    Orders:    1 Year Follow Up In Primary Care - Wellness Exam; Future    Screening for cardiovascular condition    Orders:    Lipid Panel; Future    Prediabetes    Orders:    Hemoglobin A1C; Future    Glucose; Future                   [1]   Past Medical History:  Diagnosis Date    Anticoagulant long-term use 05/14/2024    Chronic obstructive pulmonary disease (Multi) 09/06/2024    Contact with and (suspected) exposure to covid-19 03/01/2023    Dyslipidemia 04/25/2024    Generalized arthritis 03/01/2023    History of rectal cancer 03/29/2024 2008    Hypertension     Impotence of organic origin 09/06/2024    Meralgia paresthetica, left lower limb 09/06/2024    Perforated chronic peptic ulcer (Multi) 2019    Peripheral vascular disease 05/14/2024    Pulmonary nodules 04/25/2024    2013-resolved on own    Restless legs syndrome 09/06/2024    Smoker 03/14/2023    Ulcer of left foot (Multi) 03/01/2023    Vitamin D deficiency 09/06/2024   [2]   Current Outpatient Medications   Medication Sig Dispense Refill    albuterol 0.63 mg/3 mL nebulizer solution Take 3 mL (0.63 mg) by nebulization every 6 hours if needed for wheezing.      atorvastatin (Lipitor) 80 mg tablet TAKE 1 TABLET BY MOUTH EVERY DAY 90 tablet 1    clopidogrel (Plavix) 75 mg tablet Take 1 tablet (75 mg) by mouth once daily.      lisinopril 10 mg tablet Take 1 tablet (10 mg) by mouth once daily.      meloxicam (Mobic) 15 mg tablet TAKE 1 TABLET BY MOUTH EVERY DAY 90 tablet 3    metFORMIN  mg 24 hr tablet TAKE 1 TABLET BY MOUTH EVERY DAY 90 tablet 3    rOPINIRole (Requip) 2 mg tablet TAKE 1 TABLET BY  MOUTH EVERY DAY AT BEDTIME 90 tablet 3    sildenafil (Viagra) 50 mg tablet Take 0.5 tablets (25 mg) by mouth.       No current facility-administered medications for this visit.

## 2025-06-13 LAB
CHOLEST SERPL-MCNC: 126 MG/DL
CHOLEST/HDLC SERPL: 2.9 (CALC)
EST. AVERAGE GLUCOSE BLD GHB EST-MCNC: 131 MG/DL
EST. AVERAGE GLUCOSE BLD GHB EST-SCNC: 7.3 MMOL/L
GLUCOSE SERPL-MCNC: 104 MG/DL (ref 65–99)
HBA1C MFR BLD: 6.2 %
HDLC SERPL-MCNC: 44 MG/DL
LDLC SERPL CALC-MCNC: 64 MG/DL (CALC)
NONHDLC SERPL-MCNC: 82 MG/DL (CALC)
TRIGL SERPL-MCNC: 95 MG/DL

## 2025-06-20 DIAGNOSIS — E78.5 DYSLIPIDEMIA: ICD-10-CM

## 2025-06-20 RX ORDER — ATORVASTATIN CALCIUM 80 MG/1
80 TABLET, FILM COATED ORAL DAILY
Qty: 90 TABLET | Refills: 3 | Status: SHIPPED | OUTPATIENT
Start: 2025-06-20